# Patient Record
Sex: FEMALE | URBAN - METROPOLITAN AREA
[De-identification: names, ages, dates, MRNs, and addresses within clinical notes are randomized per-mention and may not be internally consistent; named-entity substitution may affect disease eponyms.]

---

## 2022-07-29 ENCOUNTER — TELEPHONE (OUTPATIENT)
Dept: OBGYN CLINIC | Facility: CLINIC | Age: 42
End: 2022-07-29

## 2022-07-29 NOTE — TELEPHONE ENCOUNTER
Patient l/m requesting her most recent pap results which was done 02/17/21 when we were [place] to her PCP which is at [address] but no fax# provided  Fabricio please advise

## 2023-01-10 ENCOUNTER — OFFICE VISIT (OUTPATIENT)
Dept: OBGYN CLINIC | Facility: CLINIC | Age: 43
End: 2023-01-10

## 2023-01-10 VITALS
WEIGHT: 146.8 LBS | BODY MASS INDEX: 26.01 KG/M2 | SYSTOLIC BLOOD PRESSURE: 86 MMHG | HEIGHT: 63 IN | DIASTOLIC BLOOD PRESSURE: 52 MMHG

## 2023-01-10 DIAGNOSIS — R31.9 HEMATURIA, UNSPECIFIED TYPE: ICD-10-CM

## 2023-01-10 DIAGNOSIS — N92.0 MENORRHAGIA WITH REGULAR CYCLE: Primary | ICD-10-CM

## 2023-01-10 LAB
SL AMB  POCT GLUCOSE, UA: NEGATIVE
SL AMB LEUKOCYTE ESTERASE,UA: NEGATIVE
SL AMB POCT BILIRUBIN,UA: NEGATIVE
SL AMB POCT BLOOD,UA: ABNORMAL
SL AMB POCT CLARITY,UA: CLEAR
SL AMB POCT COLOR,UA: YELLOW
SL AMB POCT KETONES,UA: NEGATIVE
SL AMB POCT NITRITE,UA: NEGATIVE
SL AMB POCT PH,UA: 7.5
SL AMB POCT SPECIFIC GRAVITY,UA: 1.01
SL AMB POCT URINE PROTEIN: NEGATIVE
SL AMB POCT UROBILINOGEN: 0.2

## 2023-01-10 RX ORDER — MEDROXYPROGESTERONE ACETATE 10 MG/1
10 TABLET ORAL DAILY
Qty: 7 TABLET | Refills: 0 | Status: SHIPPED | OUTPATIENT
Start: 2023-01-10

## 2023-01-10 RX ORDER — PNV NO.95/FERROUS FUM/FOLIC AC 28MG-0.8MG
TABLET ORAL
COMMUNITY

## 2023-01-10 RX ORDER — MULTIVITAMIN
1 TABLET ORAL DAILY
COMMUNITY

## 2023-01-10 RX ORDER — ZINC SULFATE 50(220)MG
220 CAPSULE ORAL
COMMUNITY

## 2023-01-19 NOTE — PROGRESS NOTES
[address] [facility], [address], [address] [facility]    Assessment/Plan:  1  Menorrhagia with regular cycle  Assessment & Plan:  Pt with prolonged menses, beginning on 12/20/22 and has continued for most days until today  Exam is benign  Will treat with provera for 1 week  Reviewed expectations  Recommend that she follow up in a cycle or two for a wellness and to discuss effectiveness of treatment  If bleeding is not controlled then she will need further evaluation  Orders:  -     medroxyPROGESTERone (PROVERA) 10 mg tablet; Take 1 tablet (10 mg total) by mouth daily    2  Hematuria, unspecified type  -     POCT urine dip      Subjective:   Alvaro Tello is a 38 y o  [id number]   CC:   Chief Complaint   Patient presents with   • Menstrual Problem     Spotting / discharge for 10 days light , no pain with urination no fever  HPI: Menses ongoing since 12/20/22 with a short break in the bleeding  ROS: Negative except as noted in HPI    Patient's last menstrual period was 12/20/2022 (exact date)  She  reports being sexually active and has had partner(s) who are male  She reports using the following method of birth control/protection: None  The following portions of the patient's history were reviewed and updated as appropriate:   No past medical history on file  Past Surgical History:   Procedure Laterality Date   • REDUCTION MAMMAPLASTY  2017     No family history on file    Social History     Socioeconomic History   • Marital status: /Civil Union     Spouse name: None   • Number of children: None   • Years of education: None   • Highest education level: None   Occupational History   • None   Tobacco Use   • Smoking status: Never   • Smokeless tobacco: Never   Substance and Sexual Activity   • Alcohol use: Yes     Comment: occasional   • Drug use: Never   • Sexual activity: Yes     Partners: Male     Birth control/protection: None     Comment: no ne partner in past year Other Topics Concern   • None   Social History Narrative   • None     Social Determinants of Health     Financial Resource Strain: Not on file   Food Insecurity: Not on file   Transportation Needs: Not on file   Physical Activity: Not on file   Stress: Not on file   Social Connections: Not on file   Intimate Partner Violence: Not on file   Housing Stability: Not on file     Outpatient Medications Marked as Taking for the 1/10/23 encounter (Office Visit) with Gary Witt MD   Medication   • Ferrous Sulfate (Iron) 325 (65 Fe) MG TABS   • medroxyPROGESTERone (PROVERA) 10 mg tablet   • Multiple Vitamin (multivitamin) tablet   • zinc sulfate (ZINCATE) 220 mg capsule     No Known Allergies        Objective:  BP (!) 86/52 (BP Location: Left arm, Patient Position: Sitting, Cuff Size: Standard)   Ht 5' 2 75" (1 594 m)   Wt 66 6 kg (146 lb 12 8 oz)   LMP 12/20/2022 (Exact Date)   Breastfeeding No   BMI 26 21 kg/m²        Chaperone present? Yes:      General Appearance: alert and oriented, in no acute distress  Abdomen: Soft, non-tender, non-distended, no masses, no rebound or guarding  Pelvic:       External genitalia: Normal appearance, no abnormal pigmentation, no lesions or masses  Normal Bartholin's and [place]'s      Urinary system: Urethral meatus normal, bladder non-tender  Vaginal: normal mucosa without prolapse or lesions  Normal-appearing physiologic discharge  Cervix: Normal-appearing, well-epithelialized, no gross lesions or masses  No cervical motion tenderness  Adnexa: No adnexal masses or tenderness noted  Uterus: Normal-sized, regular contour, midline, mobile, no uterine tenderness  Extremities: Normal range of motion     Skin: normal, no rash or abnormalities  Neurologic: alert, oriented x3  Psychiatric: Appropriate affect, mood stable, cooperative with exam         Gary Witt MD

## 2023-01-19 NOTE — ASSESSMENT & PLAN NOTE
Pt with prolonged menses, beginning on 12/20/22 and has continued for most days until today  Exam is benign  Will treat with provera for 1 week  Reviewed expectations  Recommend that she follow up in a cycle or two for a wellness and to discuss effectiveness of treatment  If bleeding is not controlled then she will need further evaluation

## 2023-02-08 ENCOUNTER — ANNUAL EXAM (OUTPATIENT)
Dept: OBGYN CLINIC | Facility: CLINIC | Age: 43
End: 2023-02-08

## 2023-02-08 VITALS
SYSTOLIC BLOOD PRESSURE: 110 MMHG | BODY MASS INDEX: 25.52 KG/M2 | WEIGHT: 144 LBS | DIASTOLIC BLOOD PRESSURE: 70 MMHG | HEIGHT: 63 IN

## 2023-02-08 DIAGNOSIS — Z12.31 ENCOUNTER FOR SCREENING MAMMOGRAM FOR MALIGNANT NEOPLASM OF BREAST: ICD-10-CM

## 2023-02-08 DIAGNOSIS — Z01.419 ROUTINE GYNECOLOGICAL EXAMINATION: Primary | ICD-10-CM

## 2023-02-08 DIAGNOSIS — N92.0 MENORRHAGIA WITH REGULAR CYCLE: ICD-10-CM

## 2023-02-08 RX ORDER — RIMEGEPANT SULFATE 75 MG/75MG
TABLET, ORALLY DISINTEGRATING ORAL
COMMUNITY
Start: 2023-02-07

## 2023-02-08 RX ORDER — RIBOFLAVIN (VITAMIN B2) 400 MG
1 TABLET ORAL EVERY MORNING
COMMUNITY
Start: 2023-01-30

## 2023-02-13 NOTE — PROGRESS NOTES
[address], [address], [facility], [address] [facility]    ASSESSMENT/PLAN: Burnis Collet is a 38 y o  [id number] who presents for annual gynecologic exam     Encounter for routine gynecologic examination  - Routine well woman exam completed today  - Cervical Cancer Screening: Current ASCCP Guidelines reviewed  Last Pap: 02/23/2021   Next Pap Due: 2024  - HPV Vaccination status: Not immunized  - Contraceptive counseling discussed  Current contraception: none  - Breast Cancer Screening: Last Mammogram Not on file, ordered  - Colorectal cancer screening was not ordered  - The following were reviewed in today's visit: breast self exam, mammography screening ordered and menopause    Additional problems addressed during this visit:  1  Routine gynecological examination    2  Menorrhagia with regular cycle  Assessment & Plan:  Bleeding did subside but the she had some spotting  Recommend monitoring for another cycle to see if she gets back into her usual pattern  If not then will recommend an U/S and will discuss longer term options      3  Encounter for screening mammogram for malignant neoplasm of breast  -     Mammo screening bilateral w 3d & cad; Future        CC:  Annual Gynecologic Examination    HPI: Burnis Collet is a 38 y o  [id number] who presents for annual gynecologic examination  HPI    The following portions of the patient's history were reviewed and updated as appropriate: She  has no past medical history on file  She  has a past surgical history that includes Reduction mammaplasty (2017)  Her family history is not on file  She  reports that she has never smoked  She has never been exposed to tobacco smoke  She has never used smokeless tobacco  She reports current alcohol use  She reports that she does not use drugs    Current Outpatient Medications   Medication Sig Dispense Refill   • Ferrous Sulfate (Iron) 325 (65 Fe) MG TABS Take by mouth     • Multiple Vitamin (multivitamin) tablet Take 1 tablet by mouth daily     • Nurtec 75 MG TBDP      • Riboflavin 400 MG TABS Take 1 tablet by mouth every morning     • zinc sulfate (ZINCATE) 220 mg capsule Take 220 mg by mouth       No current facility-administered medications for this visit  She has No Known Allergies       Review of Systems      Objective:  /70 (BP Location: Left arm, Patient Position: Sitting, Cuff Size: Standard)   Ht 5' 2 75" (1 594 m)   Wt 65 3 kg (144 lb)   LMP 01/10/2023   BMI 25 71 kg/m²    Physical Exam      PE:  General Appearance: alert and oriented, in no acute distress  HEENT: PERRL, thyroid without masses or tenderness  Breast: No masses, tenderness, skin changes, nipple D/C or axillary or supraclavicular adenopathy  Bilateral reduction scars  Abdomen: Soft, non-tender, non-distended, no masses, no rebound or guarding  Pelvic:       External genitalia: Normal appearance, no abnormal pigmentation, no lesions or masses  Normal Bartholin's and [place]'s  Urinary system: Urethral meatus normal, bladder non-tender  Vaginal: normal mucosa without prolapse or lesions  Normal-appearing physiologic discharge      Cervix: Normal-appearing, well-epithelialized, no gross lesions or masses No cervical motion tenderness  Adnexa: No adnexal masses or tenderness noted  Uterus: Normal-sized, regular contour, midline, mobile, no uterine tenderness  Extremities: Normal range of motion     Skin: normal, no rash or abnormalities  Neurologic: alert, oriented x3  Psychiatric: Appropriate affect, mood stable, cooperative with exam

## 2023-02-13 NOTE — ASSESSMENT & PLAN NOTE
Bleeding did subside but the she had some spotting  Recommend monitoring for another cycle to see if she gets back into her usual pattern   If not then will recommend an U/S and will discuss longer term options

## 2024-11-11 ENCOUNTER — OFFICE VISIT (OUTPATIENT)
Dept: OBGYN CLINIC | Facility: CLINIC | Age: 44
End: 2024-11-11
Payer: COMMERCIAL

## 2024-11-11 VITALS
DIASTOLIC BLOOD PRESSURE: 60 MMHG | SYSTOLIC BLOOD PRESSURE: 98 MMHG | WEIGHT: 158.8 LBS | BODY MASS INDEX: 27.11 KG/M2 | HEIGHT: 64 IN

## 2024-11-11 DIAGNOSIS — N90.89 VULVAR LESION: Primary | ICD-10-CM

## 2024-11-11 PROCEDURE — 99213 OFFICE O/P EST LOW 20 MIN: CPT | Performed by: OBSTETRICS & GYNECOLOGY

## 2024-11-11 NOTE — ASSESSMENT & PLAN NOTE
Suspect that this lesion is not HSV but will swab to confirm. Expect spontaneous healing. Will follow up with wellness  Orders:    HSV TYPE 1,2 DNA PCR

## 2024-11-11 NOTE — PROGRESS NOTES
"Ambulatory Visit  Name: Karlene Roth      : 1980      MRN: [id number]  Encounter Provider: Guillaume Samaniego MD  Encounter Date: 2024   Encounter department: [facility] [place]    Assessment & Plan  Vulvar lesion  Suspect that this lesion is not HSV but will swab to confirm. Expect spontaneous healing. Will follow up with wellness  Orders:    HSV TYPE 1,2 DNA PCR      History of Present Illness     Karlene Roth is a 44 y.o. female who presents stating she's been having lots of vaginal pain, she had UTI test in the last 2 months , came out negative,    History obtained from : patient  Review of Systems  No current outpatient medications on file prior to visit.     No current facility-administered medications on file prior to visit.      Social History     Tobacco Use    Smoking status: Never     Passive exposure: Never    Smokeless tobacco: Never   Vaping Use    Vaping status: Never Used   Substance and Sexual Activity    Alcohol use: Yes     Comment: occasional    Drug use: Never    Sexual activity: Yes     Partners: Male     Birth control/protection: None     Comment: no ne partner in past year         Objective     BP 98/60 (BP Location: Left arm, Patient Position: Sitting, Cuff Size: Standard)   Ht 5' 4\" (1.626 m)   Wt 72 kg (158 lb 12.8 oz)   LMP 2024 (Exact Date)   Breastfeeding No   BMI 27.26 kg/m²     Physical Exam    "

## 2024-11-14 LAB
HSV1 DNA SPEC QL NAA+PROBE: NOT DETECTED
HSV2 DNA SPEC QL NAA+PROBE: NOT DETECTED
SPECIMEN SOURCE: NORMAL

## 2024-12-02 ENCOUNTER — ANNUAL EXAM (OUTPATIENT)
Dept: OBGYN CLINIC | Facility: CLINIC | Age: 44
End: 2024-12-02
Payer: COMMERCIAL

## 2024-12-02 VITALS
BODY MASS INDEX: 27.55 KG/M2 | HEIGHT: 64 IN | WEIGHT: 161.4 LBS | DIASTOLIC BLOOD PRESSURE: 70 MMHG | SYSTOLIC BLOOD PRESSURE: 102 MMHG

## 2024-12-02 DIAGNOSIS — Z01.419 ROUTINE GYNECOLOGICAL EXAMINATION: Primary | ICD-10-CM

## 2024-12-02 DIAGNOSIS — Z12.4 SCREENING FOR MALIGNANT NEOPLASM OF THE CERVIX: ICD-10-CM

## 2024-12-02 DIAGNOSIS — Z12.31 ENCOUNTER FOR SCREENING MAMMOGRAM FOR MALIGNANT NEOPLASM OF BREAST: ICD-10-CM

## 2024-12-02 PROCEDURE — 99396 PREV VISIT EST AGE 40-64: CPT | Performed by: OBSTETRICS & GYNECOLOGY

## 2024-12-02 NOTE — PROGRESS NOTES
[facility] [place]  [address] [place] [address]    ASSESSMENT/PLAN: Karlene Roth is a 44 y.o.  who presents for annual gynecologic exam.     Encounter for routine gynecologic examination  - Routine well woman exam completed today.  - Cervical Cancer Screening: Current ASCCP Guidelines reviewed. Last Pap: 2021 . Next Pap Due: today  - HPV Vaccination status: Not immunized  - Contraceptive counseling discussed.  Current contraception: none  - Breast Cancer Screening: Last Mammogram 2024,   - Colorectal cancer screening was not ordered.  - The following were reviewed in today's visit: breast self exam, mammography screening ordered, and family planning choices    Additional problems addressed during this visit:  1. Routine gynecological examination  Comments:  Previously noted lesion resolved and HSV was negative  2. Encounter for screening mammogram for malignant neoplasm of breast  -     Mammo screening bilateral w 3d and cad; Future  3. Screening for malignant neoplasm of the cervix  -     Thinprep Tis Pap and HPV mRNA E6/E7 Reflex HPV 16,18/45      CC:  Annual Gynecologic Examination    HPI: Karlene Roth is a 44 y.o.  who presents for annual gynecologic examination.  HPI    The following portions of the patient's history were reviewed and updated as appropriate: She  has no past medical history on file.  She  has a past surgical history that includes Reduction mammaplasty (2017).  Her family history includes Cancer in her father.  She  reports that she has never smoked. She has never been exposed to tobacco smoke. She has never used smokeless tobacco. She reports current alcohol use. She reports that she does not use drugs.  No current outpatient medications on file.     No current facility-administered medications for this visit.     She has no known allergies..    Review of Systems      Objective:  /70 (BP Location: Left arm, Patient Position: Sitting, Cuff Size:  "Standard)   Ht 5' 4\" (1.626 m)   Wt 73.2 kg (161 lb 6.4 oz)   LMP 11/28/2024 (Exact Date)   Breastfeeding No   BMI 27.70 kg/m²    Physical Exam      PE:  General Appearance: alert and oriented, in no acute distress.   HEENT: PERRL, thyroid without masses or tenderness  Breast: No masses, tenderness, skin changes, nipple D/C or axillary or supraclavicular adenopathy  Abdomen: Soft, non-tender, non-distended, no masses, no rebound or guarding.  Pelvic:       External genitalia: Normal appearance, no abnormal pigmentation, no lesions or masses. Normal Bartholin's and [place]'s.      Urinary system: Urethral meatus normal, bladder non-tender.      Vaginal: normal mucosa without prolapse or lesions. Normal-appearing physiologic discharge      Cervix: Normal-appearing, well-epithelialized, no gross lesions or masses No cervical motion tenderness.      Adnexa: No adnexal masses or tenderness noted.      Uterus: Normal-sized, regular contour, midline, mobile, no uterine tenderness.  Extremities: Normal range of motion.   Skin: normal, no rash or abnormalities  Neurologic: alert, oriented x3  Psychiatric: Appropriate affect, mood stable, cooperative with exam.  "

## 2024-12-04 LAB — HPV E6+E7 MRNA CVX QL NAA+PROBE: NOT DETECTED
